# Patient Record
(demographics unavailable — no encounter records)

---

## 2025-06-03 NOTE — DISCUSSION/SUMMARY
[FreeTextEntry1] : 59 yo man with PMHx HTN   Assessment: 1. HTN 2. Syncope vs seizure   Plan: 1. TTE 2. C/w losartan-HCTZ  During non face-to-face time, I reviewed relevant portions of the patients medical record. During face-to-face time, I took a relevant history and examined the patient. I also explained differential diagnoses, relevant cardiac diagnoses, workup, and management plan, which required a moderate level of medical decision making. I answered all questions related to the patient's medical conditions.   Joe CROOK (John)  of Cardiology [facility] at \[facility]\""/[facility]        [EKG obtained to assist in diagnosis and management of assessed problem(s)] : EKG obtained to assist in diagnosis and management of assessed problem(s)

## 2025-06-03 NOTE — HISTORY OF PRESENT ILLNESS
[FreeTextEntry1] : 61 yo man with PMHx HTN and syncope vs seizure  06/02/25 Hx of intermittent syncope vs seizure

## 2025-07-17 NOTE — HISTORY OF PRESENT ILLNESS
[FreeTextEntry1] : 61 yo man with PMHx HTN and syncope vs seizure  07/17/25 No new sx since the last visit. No syncope since last visit.  06/02/25 Hx of intermittent syncope vs seizure

## 2025-07-17 NOTE — DISCUSSION/SUMMARY
[FreeTextEntry1] : 61 yo man with PMHx HTN and syncope vs seizure  Assessment: 1. HTN 2. Syncope vs seizure   Plan: 1. TTE done today, will review, prelim read EF normal, no major cardiac structural disease to cause syncope, likely he has epilepsy, asked to continue to f/u with neurology and continue AED 2. C/w losartan-HCTZ  During non face-to-face time, I reviewed relevant portions of the patients medical record. During face-to-face time, I took a relevant history and examined the patient. I also explained differential diagnoses, relevant cardiac diagnoses, workup, and management plan, which required a moderate level of medical decision making. I answered all questions related to the patient's medical conditions.   Joe CROOK (John)  of Cardiology [facility] at [facility]